# Patient Record
Sex: FEMALE | Race: WHITE | URBAN - METROPOLITAN AREA
[De-identification: names, ages, dates, MRNs, and addresses within clinical notes are randomized per-mention and may not be internally consistent; named-entity substitution may affect disease eponyms.]

---

## 2017-06-14 ENCOUNTER — THERAPY VISIT (OUTPATIENT)
Dept: PHYSICAL THERAPY | Facility: CLINIC | Age: 62
End: 2017-06-14
Payer: COMMERCIAL

## 2017-06-14 DIAGNOSIS — Z96.649 S/P TOTAL HIP ARTHROPLASTY: Primary | ICD-10-CM

## 2017-06-14 PROCEDURE — 97161 PT EVAL LOW COMPLEX 20 MIN: CPT | Mod: GP | Performed by: PHYSICAL THERAPIST

## 2017-06-14 PROCEDURE — 97110 THERAPEUTIC EXERCISES: CPT | Mod: GP | Performed by: PHYSICAL THERAPIST

## 2017-06-14 ASSESSMENT — ACTIVITIES OF DAILY LIVING (ADL)
WALKING_UP_STEEP_HILLS: UNABLE TO DO
STEPPING_UP_AND_DOWN_CURBS: SLIGHT DIFFICULTY
GOING_UP_1_FLIGHT_OF_STAIRS: SLIGHT DIFFICULTY
HOS_ADL_HIGHEST_POTENTIAL_SCORE: 60
WALKING_INITIALLY: NO DIFFICULTY AT ALL
LIGHT_TO_MODERATE_WORK: MODERATE DIFFICULTY
WALKING_APPROXIMATELY_10_MINUTES: MODERATE DIFFICULTY
GOING_DOWN_1_FLIGHT_OF_STAIRS: SLIGHT DIFFICULTY
DEEP_SQUATTING: UNABLE TO DO
HOW_WOULD_YOU_RATE_YOUR_CURRENT_LEVEL_OF_FUNCTION_DURING_YOUR_USUAL_ACTIVITIES_OF_DAILY_LIVING_FROM_0_TO_100_WITH_100_BEING_YOUR_LEVEL_OF_FUNCTION_PRIOR_TO_YOUR_HIP_PROBLEM_AND_0_BEING_THE_INABILITY_TO_PERFORM_ANY_OF_YOUR_USUAL_DAILY_ACTIVITIES?: 40
WALKING_15_MINUTES_OR_GREATER: MODERATE DIFFICULTY
PUTTING_ON_SOCKS_AND_SHOES: EXTREME DIFFICULTY
HOS_ADL_COUNT: 15
HOS_ADL_SCORE(%): 45
ROLLING_OVER_IN_BED: EXTREME DIFFICULTY
GETTING_INTO_AND_OUT_OF_AN_AVERAGE_CAR: MODERATE DIFFICULTY
HOS_ADL_ITEM_SCORE_TOTAL: 27
WALKING_DOWN_STEEP_HILLS: SLIGHT DIFFICULTY
HEAVY_WORK: UNABLE TO DO
STANDING_FOR_15_MINUTES: EXTREME DIFFICULTY
TWISTING/PIVOTING_ON_INVOLVED_LEG: EXTREME DIFFICULTY

## 2017-06-14 NOTE — PROGRESS NOTES
[facility] Initial Evaluation    Subjective:    Patient is a 62 year old female presenting with rehab left hip hpi.   Lalitha Larson is a 62 year old female with a left hip condition.        Pt underwent L CUBA on 6/7/2017, is recovering well but is a little stressed about logistics etc.  Pt describes her L hip started to bother her around two years ago, essentially out of nowhere.  Pt had tried several rounds of injections without benefit.  Pt hadn't been able to work out as much d/t the pain so feels that she is deconditioned.  Pt works as a teacher, has the summer off to recover.  .    Patient reports pain:  Anterior and lateral.  Radiates to:  Knee (anterior thigh).  Pain is described as shooting and burning and is constant and reported as 7/10.  Associated symptoms:  Loss of motion/stiffness and loss of strength. Pain is the same all the time (difficulty sleeping through the night).  Exacerbated by: reaching down, putting on socks/shoes. and relieved by rest and ice.  Since onset symptoms are unchanged.    Previous treatment includes surgery.    General health as reported by patient is good.                                              Objective:      Gait:    Assistive Devices:  Cane                                                   Hip Evaluation  HIP AROM:    Flexion: Left: 85   Right:     Extension: Left: 10   Right:       Internal Rotation: Left: mod limitation   Right:  External Rotation: Left: min limitation   Right:        Hip Strength:    Flexion:   Left: 3/5   Pain:                      Extension:  Left: 4/5  Pain:    Adduction:  Left: 4/5    Pain:                Hip Special Testing:   Not Assessed                     General     ROS    Assessment/Plan:      Patient is a 62 year old female with left side hip complaints.    Patient has the following significant findings with corresponding treatment plan.                Diagnosis 1:  S/p L CUBA, anterior approach      Pain -  hot/cold  therapy, manual therapy, self management, education and home program  Decreased ROM/flexibility - manual therapy and therapeutic exercise  Decreased strength - therapeutic exercise and therapeutic activities  Impaired gait - gait training  Impaired muscle performance - neuro re-education  Decreased function - therapeutic activities    Therapy Evaluation Codes:   1) History comprised of:   Personal factors that impact the plan of care:      None.    Comorbidity factors that impact the plan of care are:      Pain at night/rest.     Medications impacting care: Anti-depressant, Anti-inflammatory, High blood pressure and Pain.  2) Examination of Body Systems comprised of:   Body structures and functions that impact the plan of care:      Hip.   Activity limitations that impact the plan of care are:      Bending, Stairs and Walking.  3) Clinical presentation characteristics are:   Evolving/Changing.  4) Decision-Making    Low complexity using standardized patient assessment instrument and/or measureable assessment of functional outcome.  Cumulative Therapy Evaluation is: Low complexity.    Previous and current functional limitations:  (See Goal Flow Sheet for this information)    Short term and Long term goals: (See Goal Flow Sheet for this information)     Communication ability:  Patient appears to be able to clearly communicate and understand verbal and written communication and follow directions correctly.  Treatment Explanation - The following has been discussed with the patient:   RX ordered/plan of care  Anticipated outcomes  Possible risks and side effects  This patient would benefit from PT intervention to resume normal activities.   Rehab potential is good.    Frequency:  2 X week, once daily  Duration:  for 6 weeks  Discharge Plan:  Achieve all LTG.  Independent in home treatment program.  Reach maximal therapeutic benefit.    Please refer to the daily flowsheet for treatment today, total treatment time and time  spent performing 1:1 timed codes.

## 2017-06-14 NOTE — LETTER
[facility]  [address]  [address]  [place] [address]  [phone]    Keena 15, 2017    Re: Lalitha Larson   :   1955  MRN:  [id number]   REFERRING PHYSICIAN:   Rosalio Deleon    [facility] [place]  Date of Initial Evaluation:  17  Visits:  Rxs Used: 1  Reason for Referral:  S/P total hip arthroplasty    [facility] Initial Evaluation  Subjective:  Patient is a 62-year-old female presenting with rehab left hip hpi.   Lalitha Larson is a 62-year-old female with a left hip condition.  Pt underwent L CUBA on 2017, is recovering well but is a little stressed about logistics etc.  Pt describes her L hip started to bother her around two years ago, essentially out of nowhere.  Pt had tried several rounds of injections without benefit.  Pt hadn't been able to work out as much d/t the pain so feels that she is deconditioned.  Pt works as a teacher, has the summer off to recover.    Patient reports pain: Anterior and lateral.  Radiates to: Knee (anterior thigh).  Pain is described as shooting and burning and is constant and reported as 7/10.  Associated symptoms: Loss of motion/stiffness and loss of strength.  Pain is the same all the time (difficulty sleeping through the night).  Exacerbated by: reaching down, putting on socks/shoes. and relieved by rest and ice.  Since onset symptoms are unchanged.    Previous treatment includes surgery.  General health as reported by patient is good.          Objective:  Gait:    Assistive Devices:  Cane    Hip Evaluation  HIP AROM:    Flexion: Left: 85   Right:   Extension: Left: 10   Right:   Internal Rotation: Left: mod limitation   Right:  External Rotation: Left: min limitation   Right:  Hip Strength:    Flexion:   Left: 3/5   Pain:    Extension:  Left: 4/5  Pain:  Adduction:  Left: 4/5    Pain:  Hip Special Testing:   Not Assessed      Assessment/Plan:    Patient is a 62-year-old female  with left side hip complaints.      Re: Lalitha Larson   :   1955      Patient has the following significant findings with corresponding treatment plan.                Diagnosis 1:  S/p L CUBA, anterior approach  Pain -  hot/cold therapy, manual therapy, self management, education and home program  Decreased ROM/flexibility - manual therapy and therapeutic exercise  Decreased strength - therapeutic exercise and therapeutic activities  Impaired gait - gait training  Impaired muscle performance - neuro re-education  Decreased function - therapeutic activities    Therapy Evaluation Codes:   1) History comprised of:   Personal factors that impact the plan of care:  None.    Comorbidity factors that impact the plan of care are:  Pain at night/rest.     Medications impacting care: Anti-depressant, Anti-inflammatory, High blood pressure  and Pain.  2) Examination of Body Systems comprised of:   Body structures and functions that impact the plan of care:  Hip.   Activity limitations that impact the plan of care are:  Bending, Stairs and Walking.  3) Clinical presentation characteristics are:  Evolving/Changing.  4) Decision-Making:  Low complexity using standardized patient assessment instrument and/or measureable assessment of functional outcome.  Cumulative Therapy Evaluation is: Low complexity.    Previous and current functional limitations: (See Goal Flow Sheet for this information)    Short term and Long term goals: (See Goal Flow Sheet for this information)   Communication ability:  Patient appears to be able to clearly communicate and understand verbal and written communication and follow directions correctly.  Treatment Explanation - The following has been discussed with the patient: RX ordered/plan of care  Anticipated outcomes  Possible risks and side effects  This patient would benefit from PT intervention to resume normal activities.   Rehab potential is good.  Frequency:  2 X week, once daily  Duration:   for 6 weeks  Discharge Plan:  Achieve all LTG.  Independent in home treatment program.  Reach maximal therapeutic benefit.    Thank you for your referral.    INQUIRIES  Therapist: Cipriano Dumont DPT   [facility]  [address]   [address]  [place] [address]

## 2017-06-20 ENCOUNTER — THERAPY VISIT (OUTPATIENT)
Dept: PHYSICAL THERAPY | Facility: CLINIC | Age: 62
End: 2017-06-20
Payer: COMMERCIAL

## 2017-06-20 DIAGNOSIS — Z96.649 S/P TOTAL HIP ARTHROPLASTY: ICD-10-CM

## 2017-06-20 PROCEDURE — 97112 NEUROMUSCULAR REEDUCATION: CPT | Mod: GP | Performed by: PHYSICAL THERAPIST

## 2017-06-20 PROCEDURE — 97110 THERAPEUTIC EXERCISES: CPT | Mod: GP | Performed by: PHYSICAL THERAPIST

## 2017-06-23 ENCOUNTER — THERAPY VISIT (OUTPATIENT)
Dept: PHYSICAL THERAPY | Facility: CLINIC | Age: 62
End: 2017-06-23
Payer: COMMERCIAL

## 2017-06-23 DIAGNOSIS — Z96.649 S/P TOTAL HIP ARTHROPLASTY: ICD-10-CM

## 2017-06-23 PROCEDURE — 97110 THERAPEUTIC EXERCISES: CPT | Mod: GP | Performed by: PHYSICAL THERAPIST

## 2017-06-28 ENCOUNTER — THERAPY VISIT (OUTPATIENT)
Dept: PHYSICAL THERAPY | Facility: CLINIC | Age: 62
End: 2017-06-28
Payer: COMMERCIAL

## 2017-06-28 DIAGNOSIS — Z96.649 S/P TOTAL HIP ARTHROPLASTY: ICD-10-CM

## 2017-06-28 PROCEDURE — 97110 THERAPEUTIC EXERCISES: CPT | Mod: GP | Performed by: PHYSICAL THERAPIST

## 2017-06-30 ENCOUNTER — THERAPY VISIT (OUTPATIENT)
Dept: PHYSICAL THERAPY | Facility: CLINIC | Age: 62
End: 2017-06-30
Payer: COMMERCIAL

## 2017-06-30 DIAGNOSIS — Z96.649 S/P TOTAL HIP ARTHROPLASTY: ICD-10-CM

## 2017-06-30 PROCEDURE — 97110 THERAPEUTIC EXERCISES: CPT | Mod: GP | Performed by: PHYSICAL THERAPIST

## 2017-07-06 ENCOUNTER — THERAPY VISIT (OUTPATIENT)
Dept: PHYSICAL THERAPY | Facility: CLINIC | Age: 62
End: 2017-07-06
Payer: COMMERCIAL

## 2017-07-06 DIAGNOSIS — Z96.649 S/P TOTAL HIP ARTHROPLASTY: ICD-10-CM

## 2017-07-06 PROCEDURE — 97110 THERAPEUTIC EXERCISES: CPT | Mod: GP | Performed by: PHYSICAL THERAPIST

## 2017-07-06 PROCEDURE — 97112 NEUROMUSCULAR REEDUCATION: CPT | Mod: GP | Performed by: PHYSICAL THERAPIST

## 2017-07-11 ENCOUNTER — THERAPY VISIT (OUTPATIENT)
Dept: PHYSICAL THERAPY | Facility: CLINIC | Age: 62
End: 2017-07-11
Payer: COMMERCIAL

## 2017-07-11 DIAGNOSIS — Z96.649 S/P TOTAL HIP ARTHROPLASTY: ICD-10-CM

## 2017-07-11 PROCEDURE — 97110 THERAPEUTIC EXERCISES: CPT | Mod: GP | Performed by: PHYSICAL THERAPIST

## 2017-07-19 ENCOUNTER — THERAPY VISIT (OUTPATIENT)
Dept: PHYSICAL THERAPY | Facility: CLINIC | Age: 62
End: 2017-07-19
Payer: COMMERCIAL

## 2017-07-19 DIAGNOSIS — Z96.649 S/P TOTAL HIP ARTHROPLASTY: ICD-10-CM

## 2017-07-19 PROCEDURE — 97110 THERAPEUTIC EXERCISES: CPT | Mod: GP | Performed by: PHYSICAL THERAPIST

## 2017-07-27 ENCOUNTER — THERAPY VISIT (OUTPATIENT)
Dept: PHYSICAL THERAPY | Facility: CLINIC | Age: 62
End: 2017-07-27
Payer: COMMERCIAL

## 2017-07-27 DIAGNOSIS — Z96.649 S/P TOTAL HIP ARTHROPLASTY: ICD-10-CM

## 2017-07-27 PROCEDURE — 97110 THERAPEUTIC EXERCISES: CPT | Mod: GP | Performed by: PHYSICAL THERAPIST

## 2017-07-27 PROCEDURE — 97112 NEUROMUSCULAR REEDUCATION: CPT | Mod: GP | Performed by: PHYSICAL THERAPIST

## 2017-07-27 NOTE — LETTER
[facility] [place] [facility]  [address]  [address]  [place] [address]  [phone]    2017    Re: Lalitha Larson   :   1955  MRN:  [id number]   REFERRING PHYSICIAN:   Rosalio Deleon    [facility] [place] [facility]    Date of Initial Evaluation:  17  Visits:  Rxs Used: 9  Reason for Referral:  S/P total hip arthroplasty    PROGRESS  REPORT    Progress reporting period is from  to .     SUBJECTIVE  Subjective: L hip progressing well, but notes some soreness today after vacuming yesterday   Current Pain level: 0/10   Initial Pain level: 8/10     The subjective and objective information are from the last SOAP note on this patient.    OBJECTIVE       ASSESSMENT/PLAN  Updated problem list and treatment plan: Diagnosis 1:  L CUBA  Pain -  hot/cold therapy, manual therapy and home program  Decreased ROM/flexibility - manual therapy and therapeutic exercise  Decreased strength - therapeutic exercise and therapeutic activities  Impaired balance - neuro re-education and therapeutic activities  Impaired gait - gait training  Impaired muscle performance - neuro re-education  STG/LTGs have been met or progress has been made towards goals:  Yes (See Goal flow sheet completed today.)  Assessment of Progress: The patient's condition is improving.  Self Management Plans:  Patient has been instructed in a home treatment program.  I have re-evaluated this patient and find that the nature, scope, duration and intensity of the therapy is appropriate for the medical condition of the patient.  Lalitha continues to require the following intervention to meet STG and LTG's: PT  The patient is returning to your office for a recheck appointment.  Re: Lalitha ORTIZ [facility]   :   1955    Recommendations:  This patient would benefit from continued therapy.     Frequency - 1x, every other week  Duration: onver 6 weeks    Thank you for your referral.    INQUIRIES  Therapist: Debra  PIPER Clark  [facility]  [address]  [address]  [place] [address]  Phone: [phone]  Fax: [phone]

## 2017-07-27 NOTE — PROGRESS NOTES
Subjective:    HPI                    Objective:    System    Physical Exam    General     ROS    Assessment/Plan:      PROGRESS  REPORT    Progress reporting period is from 6/14 to 7/27.     SUBJECTIVE  Subjective: L hip progressing well, but notes some soreness today after vacuming yesterday   Current Pain level: 0/10   Initial Pain level: 8/10        ;   ,     The subjective and objective information are from the last SOAP note on this patient.    OBJECTIVE         ASSESSMENT/PLAN  Updated problem list and treatment plan: Diagnosis 1:  L CUBA  Pain -  hot/cold therapy, manual therapy and home program  Decreased ROM/flexibility - manual therapy and therapeutic exercise  Decreased strength - therapeutic exercise and therapeutic activities  Impaired balance - neuro re-education and therapeutic activities  Impaired gait - gait training  Impaired muscle performance - neuro re-education  STG/LTGs have been met or progress has been made towards goals:  Yes (See Goal flow sheet completed today.)  Assessment of Progress: The patient's condition is improving.  Self Management Plans:  Patient has been instructed in a home treatment program.  I have re-evaluated this patient and find that the nature, scope, duration and intensity of the therapy is appropriate for the medical condition of the patient.  Lalitha continues to require the following intervention to meet STG and LTG's: PT  The patient is returning to your office for a recheck appointment.    Recommendations:  This patient would benefit from continued therapy.     Frequency - 1x, every other week  Duration: onver 6 weeks            Please refer to the daily flowsheet for treatment today, total treatment time and time spent performing 1:1 timed codes.

## 2017-10-24 PROBLEM — Z96.649 S/P TOTAL HIP ARTHROPLASTY: Status: RESOLVED | Noted: 2017-06-14 | Resolved: 2017-10-24
